# Patient Record
Sex: FEMALE | URBAN - METROPOLITAN AREA
[De-identification: names, ages, dates, MRNs, and addresses within clinical notes are randomized per-mention and may not be internally consistent; named-entity substitution may affect disease eponyms.]

---

## 2022-02-10 ENCOUNTER — OFFICE VISIT (OUTPATIENT)
Dept: URBAN - METROPOLITAN AREA CLINIC 17 | Facility: CLINIC | Age: 77
End: 2022-02-10
Payer: MEDICARE

## 2022-02-10 DIAGNOSIS — H43.812 VITREOUS DEGENERATION, LEFT EYE: ICD-10-CM

## 2022-02-10 DIAGNOSIS — H25.13 AGE-RELATED NUCLEAR CATARACT, BILATERAL: ICD-10-CM

## 2022-02-10 DIAGNOSIS — H40.1234 LOW-TENSION GLAUCOMA, BILATERAL, INDETERMINATE STAGE: Primary | ICD-10-CM

## 2022-02-10 PROCEDURE — 99204 OFFICE O/P NEW MOD 45 MIN: CPT | Performed by: OPTOMETRIST

## 2022-02-10 PROCEDURE — 92133 CPTRZD OPH DX IMG PST SGM ON: CPT | Performed by: OPTOMETRIST

## 2022-02-10 PROCEDURE — 76514 ECHO EXAM OF EYE THICKNESS: CPT | Performed by: OPTOMETRIST

## 2022-02-10 ASSESSMENT — INTRAOCULAR PRESSURE
OD: 15
OS: 15

## 2022-02-10 NOTE — IMPRESSION/PLAN
Impression: Low-tension glaucoma, bilateral, indeterminate stage: F76.3558. Plan: Discussed diagnosis in detail with patient. Reviewed RNFL OCT. Continue Latanoprost QHS OU (erx'd). Will continue to monitor IOP.

## 2022-02-10 NOTE — IMPRESSION/PLAN
Impression: Vitreous degeneration, left eye: H43.812. Plan: There is no evidence of retinal pathology. No treatment is required at this time. Will continue to observe condition and or symptoms.

## 2022-08-10 ENCOUNTER — OFFICE VISIT (OUTPATIENT)
Dept: URBAN - METROPOLITAN AREA CLINIC 17 | Facility: CLINIC | Age: 77
End: 2022-08-10
Payer: MEDICARE

## 2022-08-10 DIAGNOSIS — H43.813 VITREOUS DEGENERATION, BILATERAL: ICD-10-CM

## 2022-08-10 DIAGNOSIS — H25.13 AGE-RELATED NUCLEAR CATARACT, BILATERAL: ICD-10-CM

## 2022-08-10 DIAGNOSIS — H40.1222 LOW-TENSION GLAUCOMA, LEFT EYE, MODERATE STAGE: Primary | ICD-10-CM

## 2022-08-10 DIAGNOSIS — H40.1211 LOW-TENSION GLAUCOMA, RIGHT EYE, MILD STAGE: ICD-10-CM

## 2022-08-10 PROCEDURE — 92133 CPTRZD OPH DX IMG PST SGM ON: CPT | Performed by: OPTOMETRIST

## 2022-08-10 PROCEDURE — 92083 EXTENDED VISUAL FIELD XM: CPT | Performed by: OPTOMETRIST

## 2022-08-10 PROCEDURE — 99214 OFFICE O/P EST MOD 30 MIN: CPT | Performed by: OPTOMETRIST

## 2022-08-10 RX ORDER — LATANOPROST 50 UG/ML
0.005 % SOLUTION OPHTHALMIC
Qty: 7.5 | Refills: 6 | Status: ACTIVE
Start: 2022-08-10

## 2022-08-10 ASSESSMENT — INTRAOCULAR PRESSURE
OD: 14
OS: 14

## 2022-08-10 NOTE — IMPRESSION/PLAN
Impression: Low-tension glaucoma, left eye, moderate stage: N27.0379. Plan: OCT/VF ordered and reviewed w/pt today. IOPs stable OU. Current therapy is best. Continue Latanoprost QHS OU (ERX'd w/refills). F/u 6mths.

## 2023-02-10 ENCOUNTER — OFFICE VISIT (OUTPATIENT)
Dept: URBAN - METROPOLITAN AREA CLINIC 17 | Facility: CLINIC | Age: 78
End: 2023-02-10
Payer: MEDICARE

## 2023-02-10 DIAGNOSIS — H40.1222 LOW-TENSION GLAUCOMA, LEFT EYE, MODERATE STAGE: Primary | ICD-10-CM

## 2023-02-10 DIAGNOSIS — H25.13 AGE-RELATED NUCLEAR CATARACT, BILATERAL: ICD-10-CM

## 2023-02-10 DIAGNOSIS — H43.813 VITREOUS DEGENERATION, BILATERAL: ICD-10-CM

## 2023-02-10 PROCEDURE — 99214 OFFICE O/P EST MOD 30 MIN: CPT | Performed by: OPTOMETRIST

## 2023-02-10 RX ORDER — LATANOPROST 50 UG/ML
0.005 % SOLUTION OPHTHALMIC
Qty: 7.5 | Refills: 6 | Status: ACTIVE
Start: 2023-02-10

## 2023-02-10 ASSESSMENT — INTRAOCULAR PRESSURE
OS: 13
OD: 13

## 2023-02-10 NOTE — IMPRESSION/PLAN
Impression: Low-tension glaucoma, left eye, moderate stage: N15.6187. Plan: OCT/VF ordered and reviewed w/pt today. IOPs stable OU. Current therapy is best. Continue Latanoprost QHS OU (ERX'd w/refills). F/u 6mths.

## 2023-08-10 ENCOUNTER — OFFICE VISIT (OUTPATIENT)
Dept: URBAN - METROPOLITAN AREA CLINIC 17 | Facility: CLINIC | Age: 78
End: 2023-08-10
Payer: MEDICARE

## 2023-08-10 DIAGNOSIS — H40.1211 LOW-TENSION GLAUCOMA, RIGHT EYE, MILD STAGE: ICD-10-CM

## 2023-08-10 DIAGNOSIS — H43.813 VITREOUS DEGENERATION, BILATERAL: ICD-10-CM

## 2023-08-10 DIAGNOSIS — H25.813 COMBINED FORMS OF AGE-RELATED CATARACT, BILATERAL: ICD-10-CM

## 2023-08-10 DIAGNOSIS — H40.1222 LOW-TENSION GLAUCOMA, LEFT EYE, MODERATE STAGE: Primary | ICD-10-CM

## 2023-08-10 PROCEDURE — 92133 CPTRZD OPH DX IMG PST SGM ON: CPT | Performed by: OPTOMETRIST

## 2023-08-10 PROCEDURE — 99214 OFFICE O/P EST MOD 30 MIN: CPT | Performed by: OPTOMETRIST

## 2023-08-10 RX ORDER — LATANOPROST 50 UG/ML
0.005 % SOLUTION OPHTHALMIC
Qty: 7.5 | Refills: 6 | Status: ACTIVE
Start: 2023-08-10

## 2023-08-10 ASSESSMENT — INTRAOCULAR PRESSURE
OD: 16
OS: 17

## 2024-02-12 ENCOUNTER — OFFICE VISIT (OUTPATIENT)
Dept: URBAN - METROPOLITAN AREA CLINIC 17 | Facility: CLINIC | Age: 79
End: 2024-02-12
Payer: MEDICARE

## 2024-02-12 DIAGNOSIS — H40.1222 LOW-TENSION GLAUCOMA, LEFT EYE, MODERATE STAGE: Primary | ICD-10-CM

## 2024-02-12 PROCEDURE — 92012 INTRM OPH EXAM EST PATIENT: CPT | Performed by: OPTOMETRIST

## 2024-02-12 PROCEDURE — 92083 EXTENDED VISUAL FIELD XM: CPT | Performed by: OPTOMETRIST

## 2024-02-12 RX ORDER — LATANOPROST 50 UG/ML
0.005 % SOLUTION OPHTHALMIC
Qty: 7.5 | Refills: 6 | Status: ACTIVE
Start: 2024-02-12

## 2024-02-12 ASSESSMENT — INTRAOCULAR PRESSURE
OD: 13
OS: 13

## 2024-08-12 ENCOUNTER — OFFICE VISIT (OUTPATIENT)
Dept: URBAN - METROPOLITAN AREA CLINIC 17 | Facility: CLINIC | Age: 79
End: 2024-08-12
Payer: MEDICARE

## 2024-08-12 DIAGNOSIS — H40.1222 LOW-TENSION GLAUCOMA, LEFT EYE, MODERATE STAGE: Primary | ICD-10-CM

## 2024-08-12 DIAGNOSIS — H25.813 COMBINED FORMS OF AGE-RELATED CATARACT, BILATERAL: ICD-10-CM

## 2024-08-12 DIAGNOSIS — H43.813 VITREOUS DEGENERATION, BILATERAL: ICD-10-CM

## 2024-08-12 PROCEDURE — 92014 COMPRE OPH EXAM EST PT 1/>: CPT | Performed by: OPTOMETRIST

## 2024-08-12 PROCEDURE — 92133 CPTRZD OPH DX IMG PST SGM ON: CPT | Performed by: OPTOMETRIST

## 2024-08-12 ASSESSMENT — INTRAOCULAR PRESSURE
OD: 16
OS: 16

## 2025-03-31 ENCOUNTER — TECH ONLY (OUTPATIENT)
Dept: URBAN - METROPOLITAN AREA CLINIC 17 | Facility: CLINIC | Age: 80
End: 2025-03-31
Payer: MEDICARE

## 2025-03-31 DIAGNOSIS — H40.1222 LOW-TENSION GLAUCOMA, LEFT EYE, MODERATE STAGE: Primary | ICD-10-CM

## 2025-04-17 ENCOUNTER — OFFICE VISIT (OUTPATIENT)
Dept: URBAN - METROPOLITAN AREA CLINIC 17 | Facility: CLINIC | Age: 80
End: 2025-04-17
Payer: MEDICARE

## 2025-04-17 DIAGNOSIS — H25.813 COMBINED FORMS OF AGE-RELATED CATARACT, BILATERAL: ICD-10-CM

## 2025-04-17 DIAGNOSIS — H40.1222 LOW-TENSION GLAUCOMA, LEFT EYE, MODERATE STAGE: Primary | ICD-10-CM

## 2025-04-17 DIAGNOSIS — H40.1211 LOW-TENSION GLAUCOMA, RIGHT EYE, MILD STAGE: ICD-10-CM

## 2025-04-17 PROCEDURE — 99214 OFFICE O/P EST MOD 30 MIN: CPT | Performed by: OPTOMETRIST

## 2025-04-17 RX ORDER — BRIMONIDINE TARTRATE 2 MG/ML
0.2 % SOLUTION/ DROPS OPHTHALMIC
Qty: 15 | Refills: 1 | Status: ACTIVE
Start: 2025-04-17

## 2025-04-17 ASSESSMENT — INTRAOCULAR PRESSURE
OS: 16
OD: 16
OS: 17

## 2025-08-19 ENCOUNTER — OFFICE VISIT (OUTPATIENT)
Dept: URBAN - METROPOLITAN AREA CLINIC 17 | Facility: CLINIC | Age: 80
End: 2025-08-19
Payer: MEDICARE

## 2025-08-19 DIAGNOSIS — H40.1222 LOW-TENSION GLAUCOMA, LEFT EYE, MODERATE STAGE: Primary | ICD-10-CM

## 2025-08-19 DIAGNOSIS — H40.1211 LOW-TENSION GLAUCOMA, RIGHT EYE, MILD STAGE: ICD-10-CM

## 2025-08-19 DIAGNOSIS — H43.813 VITREOUS DEGENERATION, BILATERAL: ICD-10-CM

## 2025-08-19 DIAGNOSIS — H25.13 AGE-RELATED NUCLEAR CATARACT, BILATERAL: ICD-10-CM

## 2025-08-19 PROCEDURE — 92133 CPTRZD OPH DX IMG PST SGM ON: CPT | Performed by: OPTOMETRIST

## 2025-08-19 PROCEDURE — 92014 COMPRE OPH EXAM EST PT 1/>: CPT | Performed by: OPTOMETRIST

## 2025-08-19 RX ORDER — BRIMONIDINE TARTRATE 2 MG/ML
0.2 % SOLUTION/ DROPS OPHTHALMIC
Qty: 15 | Refills: 1 | Status: ACTIVE
Start: 2025-08-19

## 2025-08-19 RX ORDER — LATANOPROST 50 UG/ML
0.005 % SOLUTION/ DROPS OPHTHALMIC
Qty: 7.5 | Refills: 6 | Status: ACTIVE
Start: 2025-08-19

## 2025-08-19 ASSESSMENT — INTRAOCULAR PRESSURE
OS: 13
OD: 14